# Patient Record
Sex: FEMALE | Race: WHITE | NOT HISPANIC OR LATINO | URBAN - METROPOLITAN AREA
[De-identification: names, ages, dates, MRNs, and addresses within clinical notes are randomized per-mention and may not be internally consistent; named-entity substitution may affect disease eponyms.]

---

## 2018-09-12 ENCOUNTER — OFFICE VISIT (OUTPATIENT)
Dept: URBAN - METROPOLITAN AREA CLINIC 23 | Facility: CLINIC | Age: 68
End: 2018-09-12
Payer: MEDICARE

## 2018-09-12 DIAGNOSIS — H53.19 OTHER SUBJECTIVE VISUAL DISTURBANCES: Primary | ICD-10-CM

## 2018-09-12 PROCEDURE — 92004 COMPRE OPH EXAM NEW PT 1/>: CPT | Performed by: OPHTHALMOLOGY

## 2018-09-12 ASSESSMENT — VISUAL ACUITY
OS: 20/30
OD: 20/40

## 2018-09-12 ASSESSMENT — INTRAOCULAR PRESSURE
OS: 11
OD: 13

## 2018-09-12 NOTE — IMPRESSION/PLAN
Impression: Other subjective visual disturbances: H53.19. Condition: will continue to monitor. Symptoms: will continue to monitor. Plan: Discussed diagnosis in detail with patient. No treatment is required at this time. Patient instructed to use artificial tears as needed. Will continue to observe condition and or symptoms. Call if [address] worsens.

## 2023-11-16 DIAGNOSIS — H53.19 OTHER SUBJECTIVE VISUAL DISTURBANCES: Primary | ICD-10-CM

## 2023-11-16 PROCEDURE — 99203 OFFICE O/P NEW LOW 30 MIN: CPT | Performed by: OPHTHALMOLOGY
